# Patient Record
Sex: FEMALE | Race: WHITE | NOT HISPANIC OR LATINO | Employment: OTHER | URBAN - METROPOLITAN AREA
[De-identification: names, ages, dates, MRNs, and addresses within clinical notes are randomized per-mention and may not be internally consistent; named-entity substitution may affect disease eponyms.]

---

## 2019-04-09 ENCOUNTER — TELEPHONE (OUTPATIENT)
Dept: NEUROLOGY | Facility: CLINIC | Age: 59
End: 2019-04-09

## 2019-04-09 NOTE — TELEPHONE ENCOUNTER
----- Message from Danay Cancino sent at 4/8/2019  4:06 PM CDT -----  Contact: Pt. [phone]  Needs Advice    Reason for call: The patient would like to speak to someone regarding a conversation with her PCP. She's requesting to speak to Julia if possible. Please contact the patient to discuss further.          Communication Preference:PHONE     Additional Information:

## 2019-04-17 PROBLEM — Z72.0 TOBACCO USE: Status: ACTIVE | Noted: 2019-04-17

## 2019-04-17 PROBLEM — G12.22 PROGRESSIVE BULBAR PALSY: Status: ACTIVE | Noted: 2019-04-17

## 2019-08-14 PROBLEM — G12.21 ALS (AMYOTROPHIC LATERAL SCLEROSIS): Status: ACTIVE | Noted: 2019-08-14

## 2019-08-14 PROBLEM — Z79.899 HIGH RISK MEDICATIONS (NOT ANTICOAGULANTS) LONG-TERM USE: Status: ACTIVE | Noted: 2019-08-14

## 2020-10-06 PROBLEM — R53.1 WEAKNESS: Status: ACTIVE | Noted: 2020-10-06

## 2020-10-06 PROBLEM — I21.4 NSTEMI (NON-ST ELEVATED MYOCARDIAL INFARCTION): Status: ACTIVE | Noted: 2020-09-01

## 2020-10-06 PROBLEM — M21.379 FOOT DROP: Status: ACTIVE | Noted: 2020-10-06

## 2020-10-06 PROBLEM — R06.02 SOB (SHORTNESS OF BREATH): Status: ACTIVE | Noted: 2020-10-06

## 2020-10-06 PROBLEM — R13.12 OROPHARYNGEAL DYSPHAGIA: Status: ACTIVE | Noted: 2020-04-14

## 2020-10-06 PROBLEM — G12.29 PSEUDOBULBAR PALSY: Status: ACTIVE | Noted: 2020-10-06
